# Patient Record
Sex: FEMALE | Race: WHITE | NOT HISPANIC OR LATINO | Employment: UNEMPLOYED | URBAN - NONMETROPOLITAN AREA
[De-identification: names, ages, dates, MRNs, and addresses within clinical notes are randomized per-mention and may not be internally consistent; named-entity substitution may affect disease eponyms.]

---
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
## 2024-07-15 ENCOUNTER — TELEPHONE (OUTPATIENT)
Dept: OBSTETRICS AND GYNECOLOGY | Facility: CLINIC | Age: 19
End: 2024-07-15
Payer: MEDICAID

## 2024-07-15 NOTE — TELEPHONE ENCOUNTER
----- Message from Almita Prince sent at 7/15/2024  1:10 PM CDT -----  Contact: pt  Pt calling to schedule appt for pregnancy confirmation and she can be reached at [phone].    Thanks,

## 2024-07-15 NOTE — TELEPHONE ENCOUNTER
----- Message from Salo Gibson sent at 7/15/2024 12:22 PM CDT -----  Contact: Jasmin Castellanos is needing a call back in regards to scheduling a pregnancy confirmation appt. Please give her a call back at [phone]

## 2024-07-15 NOTE — TELEPHONE ENCOUNTER
Informed patient Ms. Castellanos is partnered with Dr. Carey and Dr. Suarez, who will deliver her. Patient verbalized she will call back.

## 2024-08-05 DIAGNOSIS — Z03.89 ENCOUNTER FOR OBSERVATION FOR OTHER SUSPECTED DISEASES AND CONDITIONS RULED OUT: ICD-10-CM

## 2024-08-05 DIAGNOSIS — Z34.91 ENCOUNTER FOR PREGNANCY RELATED EXAMINATION IN FIRST TRIMESTER: Primary | ICD-10-CM

## 2024-08-09 ENCOUNTER — PROCEDURE VISIT (OUTPATIENT)
Dept: OBSTETRICS AND GYNECOLOGY | Facility: CLINIC | Age: 19
End: 2024-08-09
Payer: MEDICAID

## 2024-08-09 DIAGNOSIS — Z34.91 ENCOUNTER FOR PREGNANCY RELATED EXAMINATION IN FIRST TRIMESTER: ICD-10-CM

## 2024-08-09 PROCEDURE — 76801 OB US < 14 WKS SINGLE FETUS: CPT | Mod: ,,, | Performed by: OBSTETRICS & GYNECOLOGY

## 2024-08-14 ENCOUNTER — TELEPHONE (OUTPATIENT)
Dept: OBSTETRICS AND GYNECOLOGY | Facility: CLINIC | Age: 19
End: 2024-08-14
Payer: MEDICAID

## 2024-08-14 NOTE — TELEPHONE ENCOUNTER
Talked with pt about unisom & b6 pt said she will try that, I also advised her that if she thinks she needs that she can go to the ER and let them giver her fluids to help also. Pt verbalized understanding     ----- Message from Nay Raines sent at 8/14/2024 10:41 AM CDT -----  Regarding: Medical Advice  Contact: jazz  Type:  Needs Medical Advice    Who Called: Jazz   Symptoms (please be specific): vomiting most meals   How long has patient had these symptoms:     Pharmacy name and phone #:    Would the patient rather a call back or a response via My [facility]? call  Best Call Back Number:  [phone] (home)    Additional Information: Jazz is requesting a callback from the nurse today in regard to her symptoms and she is pregnant. Her next appointment is 08/30/2024

## 2024-08-30 ENCOUNTER — ROUTINE PRENATAL (OUTPATIENT)
Dept: OBSTETRICS AND GYNECOLOGY | Facility: CLINIC | Age: 19
End: 2024-08-30
Payer: MEDICAID

## 2024-08-30 VITALS
BODY MASS INDEX: 39.82 KG/M2 | SYSTOLIC BLOOD PRESSURE: 125 MMHG | DIASTOLIC BLOOD PRESSURE: 77 MMHG | HEART RATE: 100 BPM | WEIGHT: 232 LBS

## 2024-08-30 DIAGNOSIS — Z34.91 ENCOUNTER FOR PREGNANCY RELATED EXAMINATION IN FIRST TRIMESTER: Primary | ICD-10-CM

## 2024-08-30 NOTE — PROGRESS NOTES
Subjective:       Patient ID: Jasmin Cooper is a 19 y.o.  at 10w5d   Chief Complaint:  Initial Prenatal Visit      History of Present Illness  here for new ob exam.  Labs and history were reviewed with the patient today  No complaints      Past Medical History:   Diagnosis Date    ADHD (attention deficit hyperactivity disorder)     Bladder dysfunction     Depression        History reviewed. No pertinent surgical history.    OB:    OB History    Para Term  AB Living   1 0 0 0 0 0   SAB IAB Ectopic Multiple Live Births   0 0 0 0 0      # Outcome Date GA Lbr Otto/2nd Weight Sex Type Anes PTL Lv   1 Current              Gyn: no STD, never had abn pap   Meds: No current outpatient medications on file.    All: Review of patient's allergies indicates:  No Known Allergies    SH:   Social History     Tobacco Use    Smoking status: Every Day     Types: Vaping with nicotine    Smokeless tobacco: Never   Substance Use Topics    Alcohol use: Yes     Comment: OCC      FH: family history is not on file.      Review of Systems  nml 1st trimester sx- sob, dec excercixe tolerance, fatigue and nausea  Neg for vag bleed, dc, vomiting, cp, lof, fever, chills, ns, visual changes, swelling, headaches, constipation/diarrhea, dysuria, freq/urgency of urination     Objective:     Vitals:    24 0847   BP: 125/77   Pulse: 100   Weight: 105.2 kg (232 lb)       NAD  NCAT  pupils normal size  Skin nml no rashes or lesions  No resp distress, resp even and unlabored  nt nd, no rebound no guarding  nml ext fem gent, normal cvx uterus and adnexa, no dc or bleeding  nml appearing rectum  No cyanosis or clubbing, edema appropriate for pregn    Uterus size approp for gest age         Assessment:        1. Encounter for pregnancy related examination in first trimester               Plan:      Encounter for pregnancy related examination in first trimester  -     Trichomonas Vaginalis, JUIEC  -     C. trachomatis/N.  gonorrhoeae by AMP DNA [facility]; Cervix           Pain fever bleeding precautions  Encouraged PNV  rtc 4 wks

## 2024-10-01 ENCOUNTER — PATIENT MESSAGE (OUTPATIENT)
Dept: OBSTETRICS AND GYNECOLOGY | Facility: CLINIC | Age: 19
End: 2024-10-01
Payer: MEDICAID

## 2024-10-06 ENCOUNTER — PATIENT MESSAGE (OUTPATIENT)
Dept: OTHER | Facility: OTHER | Age: 19
End: 2024-10-06
Payer: MEDICAID

## 2024-10-09 ENCOUNTER — ROUTINE PRENATAL (OUTPATIENT)
Dept: OBSTETRICS AND GYNECOLOGY | Facility: CLINIC | Age: 19
End: 2024-10-09
Payer: MEDICAID

## 2024-10-09 ENCOUNTER — TELEPHONE (OUTPATIENT)
Dept: OBSTETRICS AND GYNECOLOGY | Facility: CLINIC | Age: 19
End: 2024-10-09

## 2024-10-09 VITALS
BODY MASS INDEX: 40.36 KG/M2 | HEART RATE: 84 BPM | WEIGHT: 235.13 LBS | DIASTOLIC BLOOD PRESSURE: 70 MMHG | SYSTOLIC BLOOD PRESSURE: 106 MMHG

## 2024-10-09 DIAGNOSIS — Z67.91 RH NEGATIVE STATE IN ANTEPARTUM PERIOD: ICD-10-CM

## 2024-10-09 DIAGNOSIS — Z36.89 ENCOUNTER FOR FETAL ANATOMIC SURVEY: ICD-10-CM

## 2024-10-09 DIAGNOSIS — O26.899 RH NEGATIVE STATE IN ANTEPARTUM PERIOD: ICD-10-CM

## 2024-10-09 DIAGNOSIS — Z34.02 ENCOUNTER FOR SUPERVISION OF NORMAL FIRST PREGNANCY IN SECOND TRIMESTER: Primary | ICD-10-CM

## 2024-10-09 DIAGNOSIS — F12.10 TETRAHYDROCANNABINOL (THC) USE DISORDER, MILD, ABUSE: ICD-10-CM

## 2024-10-09 LAB
AMPHETAMINES (500): NEGATIVE NG/ML
BARBITURATES (200): NEGATIVE NG/ML
BENZODIAZEPINES: NEGATIVE NG/ML
COCAINE (150): NEGATIVE NG/ML
MARIJUANA QUANTITATION: 82 NG/ML (ref 0–50)
MARIJUANA, THC (50): ABNORMAL NG/ML
METHADONE: NEGATIVE NG/ML
OPIATES: NEGATIVE NG/ML
OXYCODONE: NEGATIVE NG/ML
PH: 7.2 (ref 4.5–8)
PHENCYCLIDINE (25): NEGATIVE NG/ML
URINE CREATININE D/S: 148.4 MG/DL

## 2024-10-09 PROCEDURE — 99213 OFFICE O/P EST LOW 20 MIN: CPT | Mod: TH,S$PBB,, | Performed by: NURSE PRACTITIONER

## 2024-10-09 NOTE — PROGRESS NOTES
Subjective:       Patient ID: Jasmin Cooper is a 19 y.o.  at 16w3d      Chief Complaint:  Routine Prenatal Visit      History of Present Illness  No complaints. Reports normal fetal movement. Labs and history reviewed with pt.   Stopped Thc use in July     Review of Systems  Denies n/v, f/c, dysuria, contractions,   VD, VB, round ligament pain, headaches      OB History          1    Para   0    Term   0       0    AB   0    Living   0         SAB   0    IAB   0    Ectopic   0    Multiple   0    Live Births   0                   Objective:     Vitals:    10/09/24 0809   BP: 106/70   Pulse: 84     Wt Readings from Last 3 Encounters:   10/09/24 106.7 kg (235 lb 2 oz)   24 105.2 kg (232 lb)   23 98.5 kg (217 lb 4 oz)        nad  NCAT  pupils normal size  Skin nml no rashes or lesions  No resp distress, resp even and unlabored  Gravid nt, no rebound no guarding  No cyanosis or clubbing, edema appropriate for pregn  FH AGA  FHT: 150s       Assessment:        1. Encounter for supervision of normal first pregnancy in second trimester    2. Tetrahydrocannabinol (THC) use disorder, mild, abuse    3. Encounter for fetal anatomic survey    4. Rh negative state in antepartum period                Plan:        Encounter for supervision of normal first pregnancy in second trimester  -     Maternal Screen AFP (Single Marker); Future; Expected date: 10/09/2024    Tetrahydrocannabinol (THC) use disorder, mild, abuse  -     Drug Screen 8 Panel    Encounter for fetal anatomic survey  -     US OB/GYN Procedure (Viewpoint) - Extended List; Future    Rh negative state in antepartum period       Declines flu vaccine    Anatomy scan @ 22 weeks   Encouraged PNV  Pain, fever, bleeding precautions   Follow up in about 4 weeks (around 2024).

## 2024-10-09 NOTE — TELEPHONE ENCOUNTER
----- Message from Mandie sent at 10/9/2024  9:40 AM CDT -----  Contact: self  Patient is requesting a call back regarding appt today - asking can she come back by to get a doctors excuse for her boyfriends job . Please call back at [phone]

## 2024-10-09 NOTE — LETTER
October 9, 2024      [place] [facility]) - OB GYN  [address]  [place] [address]  Phone: [phone]  Fax: [phone]       Patient: Jasmin Cooper   YOB: 2005  Date of Visit: 10/09/2024    To Whom It May Concern:        Payal Cooper  was at [facility] on 10/09/2024. The patient may return to work/school on *** {With/no:58323} restrictions. If you have any questions or concerns, or if I can be of further assistance, please do not hesitate to contact me.    Sincerely,    Jasmin Crowell NP

## 2024-10-11 LAB
AFP MOM: 1.14
AFP, SERUM: 31 NG/ML
CALC'D GESTATIONAL AGE: 16.4 WEEKS
COLLECTION DATE: NORMAL
COMMENT: NORMAL
DATE OF BIRTH: NORMAL
DONOR AGE: EGG RETRIEVAL: NORMAL
DONOR EGG: NO
EDD DETERMINED BY: NORMAL
EST'D DATE OF DELIVERY: NORMAL
HX OF NEURAL TUBE DEFECTS: NO
INSULIN DEPEND DIABETIC: NO
INTERPRETATION: NORMAL
Lab: NORMAL
MATERNAL WEIGHT: 235 LBS
MOTHER'S ETHNIC ORIGIN: NORMAL
NUMBER OF FETUSES: 1
PREV PREGNANCY DOWN SYND: NO
REPEAT SPECIMEN: NO
RISK FOR ONTD: NORMAL

## 2024-10-13 ENCOUNTER — PATIENT MESSAGE (OUTPATIENT)
Dept: OTHER | Facility: OTHER | Age: 19
End: 2024-10-13
Payer: MEDICAID

## 2024-11-03 ENCOUNTER — PATIENT MESSAGE (OUTPATIENT)
Dept: OTHER | Facility: OTHER | Age: 19
End: 2024-11-03
Payer: MEDICAID

## 2024-11-06 ENCOUNTER — ROUTINE PRENATAL (OUTPATIENT)
Dept: OBSTETRICS AND GYNECOLOGY | Facility: CLINIC | Age: 19
End: 2024-11-06
Payer: MEDICAID

## 2024-11-06 VITALS
SYSTOLIC BLOOD PRESSURE: 110 MMHG | HEART RATE: 85 BPM | BODY MASS INDEX: 41.97 KG/M2 | WEIGHT: 244.5 LBS | DIASTOLIC BLOOD PRESSURE: 68 MMHG

## 2024-11-06 DIAGNOSIS — O26.899 RH NEGATIVE STATE IN ANTEPARTUM PERIOD: ICD-10-CM

## 2024-11-06 DIAGNOSIS — Z67.91 RH NEGATIVE STATE IN ANTEPARTUM PERIOD: ICD-10-CM

## 2024-11-06 DIAGNOSIS — F12.10 TETRAHYDROCANNABINOL (THC) USE DISORDER, MILD, ABUSE: ICD-10-CM

## 2024-11-06 DIAGNOSIS — Z34.02 ENCOUNTER FOR SUPERVISION OF NORMAL FIRST PREGNANCY IN SECOND TRIMESTER: Primary | ICD-10-CM

## 2024-11-06 LAB
AMPHETAMINES (500): NEGATIVE NG/ML
BARBITURATES (200): NEGATIVE NG/ML
BENZODIAZEPINES: NEGATIVE NG/ML
COCAINE (150): NEGATIVE NG/ML
MARIJUANA, THC (50): NEGATIVE NG/ML
METHADONE: NEGATIVE NG/ML
OPIATES: NEGATIVE NG/ML
OXYCODONE: NEGATIVE NG/ML
PH: 6.1 (ref 4.5–8)
PHENCYCLIDINE (25): NEGATIVE NG/ML
URINE CREATININE D/S: 157.8 MG/DL

## 2024-11-06 PROCEDURE — 99213 OFFICE O/P EST LOW 20 MIN: CPT | Mod: TH,S$PBB,, | Performed by: NURSE PRACTITIONER

## 2024-11-06 RX ORDER — FAMOTIDINE 10 MG/1
10 TABLET ORAL 2 TIMES DAILY
COMMUNITY

## 2024-11-06 NOTE — PROGRESS NOTES
Subjective:       Patient ID: Jasmin Cooepr is a 19 y.o.  at 20w3d      Chief Complaint:  Routine Prenatal Visit      History of Present Illness  No complaints. Labs and history reviewed with pt.       Review of Systems  Denies n/v, f/c, dysuria, contractions,   VD, VB, round ligament pain, headaches      OB History          1    Para   0    Term   0       0    AB   0    Living   0         SAB   0    IAB   0    Ectopic   0    Multiple   0    Live Births   0                   Objective:     Vitals:    24 0835   BP: 110/68   Pulse: 85     Wt Readings from Last 3 Encounters:   24 110.9 kg (244 lb 8 oz)   10/09/24 106.7 kg (235 lb 2 oz)   24 105.2 kg (232 lb)        nad  NCAT  pupils normal size  Skin nml no rashes or lesions  No resp distress, resp even and unlabored  Gravid nt, no rebound no guarding  No cyanosis or clubbing, edema appropriate for pregn  FH AGA  FHT: 140s       Assessment:        1. Encounter for supervision of normal first pregnancy in second trimester    2. Rh negative state in antepartum period    3. Tetrahydrocannabinol (THC) use disorder, mild, abuse                Plan:        Encounter for supervision of normal first pregnancy in second trimester    Rh negative state in antepartum period    Tetrahydrocannabinol (THC) use disorder, mild, abuse  -     Drug Screen 8 Panel         Anatomy scan @ 22 weeks   Encouraged PNV  Pain, fever, bleeding precautions   Follow up in about 4 weeks (around 2024).

## 2024-11-22 ENCOUNTER — PROCEDURE VISIT (OUTPATIENT)
Dept: OBSTETRICS AND GYNECOLOGY | Facility: CLINIC | Age: 19
End: 2024-11-22
Payer: MEDICAID

## 2024-11-22 DIAGNOSIS — Z36.89 ENCOUNTER FOR FETAL ANATOMIC SURVEY: ICD-10-CM

## 2024-12-01 ENCOUNTER — PATIENT MESSAGE (OUTPATIENT)
Dept: OTHER | Facility: OTHER | Age: 19
End: 2024-12-01
Payer: MEDICAID

## 2024-12-09 ENCOUNTER — ROUTINE PRENATAL (OUTPATIENT)
Dept: OBSTETRICS AND GYNECOLOGY | Facility: CLINIC | Age: 19
End: 2024-12-09
Payer: MEDICAID

## 2024-12-09 VITALS
WEIGHT: 257.5 LBS | HEART RATE: 90 BPM | BODY MASS INDEX: 44.2 KG/M2 | DIASTOLIC BLOOD PRESSURE: 69 MMHG | SYSTOLIC BLOOD PRESSURE: 106 MMHG

## 2024-12-09 DIAGNOSIS — O26.899 RH NEGATIVE STATE IN ANTEPARTUM PERIOD: ICD-10-CM

## 2024-12-09 DIAGNOSIS — Z34.02 ENCOUNTER FOR SUPERVISION OF NORMAL FIRST PREGNANCY IN SECOND TRIMESTER: Primary | ICD-10-CM

## 2024-12-09 DIAGNOSIS — Z67.91 RH NEGATIVE STATE IN ANTEPARTUM PERIOD: ICD-10-CM

## 2024-12-09 PROCEDURE — 99213 OFFICE O/P EST LOW 20 MIN: CPT | Mod: TH,S$PBB,, | Performed by: NURSE PRACTITIONER

## 2024-12-09 NOTE — PROGRESS NOTES
Subjective:       Patient ID: Jasmin Cooper is a 19 y.o.  at 25w1d      Chief Complaint:  Routine Prenatal Visit      History of Present Illness  No complaints. Reports normal fetal movement. Labs and history reviewed with pt.       Review of Systems  Denies n/v, f/c, dysuria, contractions,   VD, VB, round ligament pain, headaches      OB History          1    Para   0    Term   0       0    AB   0    Living   0         SAB   0    IAB   0    Ectopic   0    Multiple   0    Live Births   0                   Objective:     Vitals:    24 0816   BP: 106/69   Pulse: 90     Wt Readings from Last 3 Encounters:   24 116.8 kg (257 lb 8 oz)   24 110.9 kg (244 lb 8 oz)   10/09/24 106.7 kg (235 lb 2 oz)        nad  NCAT  pupils normal size  Skin nml no rashes or lesions  No resp distress, resp even and unlabored  Gravid nt, no rebound no guarding  No cyanosis or clubbing, edema appropriate for pregn  FH AGA  FHT: 140s       Assessment:        1. Encounter for supervision of normal first pregnancy in second trimester    2. Rh negative state in antepartum period                Plan:        Encounter for supervision of normal first pregnancy in second trimester  -     Glucose, 1 Hr Post 50 GM; Future; Expected date: 2024    Rh negative state in antepartum period    Rhogam @ 28 weeks    Encouraged PNV  Pain, fever, bleeding precautions   Follow up in about 3 weeks (around 2024).           stable

## 2024-12-10 LAB — GLUCOSE 1 HR POST 50 GM: 128 MG/DL

## 2024-12-15 ENCOUNTER — PATIENT MESSAGE (OUTPATIENT)
Dept: OTHER | Facility: OTHER | Age: 19
End: 2024-12-15
Payer: MEDICAID

## 2025-01-06 ENCOUNTER — ROUTINE PRENATAL (OUTPATIENT)
Dept: OBSTETRICS AND GYNECOLOGY | Facility: CLINIC | Age: 20
End: 2025-01-06
Payer: MEDICAID

## 2025-01-06 VITALS
SYSTOLIC BLOOD PRESSURE: 111 MMHG | WEIGHT: 264 LBS | DIASTOLIC BLOOD PRESSURE: 76 MMHG | BODY MASS INDEX: 45.32 KG/M2 | HEART RATE: 91 BPM

## 2025-01-06 DIAGNOSIS — Z67.91 RH NEGATIVE STATE IN ANTEPARTUM PERIOD: ICD-10-CM

## 2025-01-06 DIAGNOSIS — K21.9 GASTROESOPHAGEAL REFLUX IN PREGNANCY: ICD-10-CM

## 2025-01-06 DIAGNOSIS — O99.619 GASTROESOPHAGEAL REFLUX IN PREGNANCY: ICD-10-CM

## 2025-01-06 DIAGNOSIS — Z34.03 ENCOUNTER FOR SUPERVISION OF NORMAL FIRST PREGNANCY IN THIRD TRIMESTER: Primary | ICD-10-CM

## 2025-01-06 DIAGNOSIS — O26.899 RH NEGATIVE STATE IN ANTEPARTUM PERIOD: ICD-10-CM

## 2025-01-06 LAB
ABO + RH BLD: NORMAL
BASOPHILS NFR BLD: 0.2 % (ref 0–3)
EOSINOPHIL NFR BLD: 0.4 % (ref 1–3)
ERYTHROCYTE [DISTWIDTH] IN BLOOD BY AUTOMATED COUNT: 12.8 % (ref 12.5–18)
HCT VFR BLD AUTO: 34.4 % (ref 37–47)
HGB BLD-MCNC: 12.1 G/DL (ref 12–16)
INDIRECT COOMBS: NEGATIVE
LYMPHOCYTES NFR BLD: 16.5 % (ref 25–40)
MCH RBC QN AUTO: 30.9 PG (ref 27–31.2)
MCHC RBC AUTO-ENTMCNC: 35.2 G/DL (ref 31.8–35.4)
MCV RBC AUTO: 87.8 FL (ref 80–97)
MONOCYTES NFR BLD: 7.4 % (ref 1–15)
NEUTROPHILS # BLD AUTO: 8.22 10*3/UL (ref 1.8–7.7)
NEUTROPHILS NFR BLD: 75 % (ref 37–80)
NUCLEATED RED BLOOD CELLS: 0 %
PLATELETS: 247 10*3/UL (ref 142–424)
RBC # BLD AUTO: 3.92 10*6/UL (ref 4.2–5.4)
RH IMMUNE GLOBULIN: NORMAL
WBC # BLD: 11 10*3/UL (ref 4.6–10.2)

## 2025-01-06 PROCEDURE — 99213 OFFICE O/P EST LOW 20 MIN: CPT | Mod: TH,S$PBB,, | Performed by: NURSE PRACTITIONER

## 2025-01-06 RX ORDER — OMEPRAZOLE 40 MG/1
40 CAPSULE, DELAYED RELEASE ORAL DAILY
Qty: 30 CAPSULE | Refills: 3 | Status: SHIPPED | OUTPATIENT
Start: 2025-01-06

## 2025-01-06 RX ORDER — OMEPRAZOLE 40 MG/1
40 CAPSULE, DELAYED RELEASE ORAL
COMMUNITY
Start: 2024-11-14 | End: 2025-01-06 | Stop reason: SDUPTHER

## 2025-01-06 NOTE — PROGRESS NOTES
CC: Follow-Up OB    HPI:   19 y.o.  at 29w1d with EDC of 3/23/2025, by Ultrasound, here for her follow up OB visit.  Denies any complaints.    Pregnancy ROS:  Positive fetal movement   Negative leakage of fluid   Negative vaginal bleeding   Negative headache, vision changes, RUQ pain, epigastric pain  Negative contractions/abdominal pain   Negative pelvic pressure     Physical Exam:  Prenatal Vitals  BP: 111/76  Weight: 119.7 kg (264 lb)  Fetal Heart Rate: 150s  Urine Glucose: Negative  Urine Albumin: Negative    Wt Readings from Last 3 Encounters:   25 119.7 kg (264 lb)   24 116.8 kg (257 lb 8 oz)   24 110.9 kg (244 lb 8 oz)       Body mass index is 45.32 kg/m².    General: NAD, well developed, well nourished  Psych: alert and oriented to person, time and place, normal affect  HEENT: normocephalic, atraumatic  Abd: Gravid, soft, NT, ND  Skin: warm, dry  Neuro: normal gait, gross motor function intact  No cyanosis, clubbing or edema    FHTs: 150s  FH: AGA    Maternal Blood Type: A negative     ASSESSMENT: 19 y.o.  @ 29w1d with   1. Encounter for supervision of normal first pregnancy in third trimester    2. Rh negative state in antepartum period    3. Gastroesophageal reflux in pregnancy         PLAN:  Encounter for supervision of normal first pregnancy in third trimester  -     CBC Auto Differential; Future; Expected date: 2025    Rh negative state in antepartum period  -      Rhogam; Future; Expected date: 2025    Gastroesophageal reflux in pregnancy  -     omeprazole (PRILOSEC) 40 MG capsule; Take 1 capsule (40 mg total) by mouth once daily.  Dispense: 30 capsule; Refill: 3      Rhogam today    Pain, fever, bleeding precautions  Labor, Fetal movement, and Preeclampsia precautions  Cont PNV  Return to clinic in 2 weeks

## 2025-01-12 ENCOUNTER — PATIENT MESSAGE (OUTPATIENT)
Dept: OTHER | Facility: OTHER | Age: 20
End: 2025-01-12
Payer: MEDICAID

## 2025-01-26 ENCOUNTER — PATIENT MESSAGE (OUTPATIENT)
Dept: OTHER | Facility: OTHER | Age: 20
End: 2025-01-26
Payer: MEDICAID

## 2025-01-29 ENCOUNTER — ROUTINE PRENATAL (OUTPATIENT)
Dept: OBSTETRICS AND GYNECOLOGY | Facility: CLINIC | Age: 20
End: 2025-01-29
Payer: MEDICAID

## 2025-01-29 VITALS
SYSTOLIC BLOOD PRESSURE: 105 MMHG | HEART RATE: 76 BPM | BODY MASS INDEX: 46.43 KG/M2 | WEIGHT: 270.5 LBS | DIASTOLIC BLOOD PRESSURE: 69 MMHG

## 2025-01-29 DIAGNOSIS — Z34.03 ENCOUNTER FOR SUPERVISION OF NORMAL FIRST PREGNANCY IN THIRD TRIMESTER: Primary | ICD-10-CM

## 2025-01-29 PROCEDURE — 99213 OFFICE O/P EST LOW 20 MIN: CPT | Mod: TH,S$PBB,, | Performed by: NURSE PRACTITIONER

## 2025-01-29 NOTE — PROGRESS NOTES
CC: Follow-Up OB    HPI:   19 y.o.  at 32w3d with EDC of 3/23/2025, by Ultrasound, here for her follow up OB visit.  Denies any complaints.    Pregnancy ROS:  Positive fetal movement   Negative leakage of fluid   Negative vaginal bleeding   Negative headache, vision changes, RUQ pain, epigastric pain  Negative contractions/abdominal pain   Negative pelvic pressure     Physical Exam:  Prenatal Vitals  BP: 105/69  Weight: 122.7 kg (270 lb 8 oz)  Fetal Heart Rate: 140's    Wt Readings from Last 3 Encounters:   25 122.7 kg (270 lb 8 oz)   25 119.7 kg (264 lb)   24 116.8 kg (257 lb 8 oz)       Body mass index is 46.43 kg/m².    General: NAD, well developed, well nourished  Psych: alert and oriented to person, time and place, normal affect  HEENT: normocephalic, atraumatic  Abd: Gravid, soft, NT, ND  Skin: warm, dry  Neuro: normal gait, gross motor function intact  No cyanosis, clubbing or edema    FHTs: 140s  FH: AGA    Maternal Blood Type: AN s/p Rhogam     ASSESSMENT: 19 y.o.  @ 32w3d with   1. Encounter for supervision of normal first pregnancy in third trimester         PLAN:  Encounter for supervision of normal first pregnancy in third trimester  -     Tdap (BOOSTRIX) vaccine injection 0.5 mL      Discussed breastfeeding and prenatal education classes available.    Pain, fever, bleeding precautions   Labor, Fetal movement, and Preeclampsia precautions  Cont PNV  Return to clinic in 2 weeks

## 2025-02-10 ENCOUNTER — ROUTINE PRENATAL (OUTPATIENT)
Dept: OBSTETRICS AND GYNECOLOGY | Facility: CLINIC | Age: 20
End: 2025-02-10
Payer: MEDICAID

## 2025-02-10 VITALS
WEIGHT: 274.38 LBS | SYSTOLIC BLOOD PRESSURE: 119 MMHG | DIASTOLIC BLOOD PRESSURE: 82 MMHG | BODY MASS INDEX: 47.1 KG/M2 | HEART RATE: 109 BPM

## 2025-02-10 DIAGNOSIS — Z34.03 ENCOUNTER FOR SUPERVISION OF NORMAL FIRST PREGNANCY IN THIRD TRIMESTER: Primary | ICD-10-CM

## 2025-02-10 DIAGNOSIS — Z67.91 RH NEGATIVE STATE IN ANTEPARTUM PERIOD: ICD-10-CM

## 2025-02-10 DIAGNOSIS — O26.899 RH NEGATIVE STATE IN ANTEPARTUM PERIOD: ICD-10-CM

## 2025-02-10 PROCEDURE — 99213 OFFICE O/P EST LOW 20 MIN: CPT | Mod: TH,S$PBB,, | Performed by: NURSE PRACTITIONER

## 2025-02-10 NOTE — PROGRESS NOTES
CC: Follow-Up OB    HPI:   19 y.o.  at 34w1d with EDC of 3/23/2025, by Ultrasound, here for her follow up OB visit.  Denies any complaints.    Pregnancy ROS:  Positive fetal movement   Negative leakage of fluid   Negative vaginal bleeding   Negative headache, vision changes, RUQ pain, epigastric pain  Negative contractions/abdominal pain   Negative pelvic pressure     Physical Exam:  Prenatal Vitals  BP: 119/82  Weight: 124.5 kg (274 lb 6 oz)  Fetal Heart Rate: 140's  Urine Glucose: Negative  Urine Albumin: Negative    Wt Readings from Last 3 Encounters:   02/10/25 124.5 kg (274 lb 6 oz)   25 122.7 kg (270 lb 8 oz)   25 119.7 kg (264 lb)       Body mass index is 47.1 kg/m².    General: NAD, well developed, well nourished  Psych: alert and oriented to person, time and place, normal affect  HEENT: normocephalic, atraumatic  Abd: Gravid, soft, NT, ND  Skin: warm, dry  Neuro: normal gait, gross motor function intact  No cyanosis, clubbing or edema    FHTs: 140s  FH: AGA    Maternal Blood Type: AN s/p Rhogam     ASSESSMENT: 19 y.o.  @ 34w1d with   1. Encounter for supervision of normal first pregnancy in third trimester    2. Rh negative state in antepartum period         PLAN:  Encounter for supervision of normal first pregnancy in third trimester    Rh negative state in antepartum period       Pain, fever, bleeding precautions  Labor, Fetal movement, and Preeclampsia precautions  Cont PNV  Return to clinic in 2 weeks with Dr Carey

## 2025-02-16 ENCOUNTER — PATIENT MESSAGE (OUTPATIENT)
Dept: OTHER | Facility: OTHER | Age: 20
End: 2025-02-16
Payer: MEDICAID

## 2025-02-21 ENCOUNTER — ROUTINE PRENATAL (OUTPATIENT)
Dept: OBSTETRICS AND GYNECOLOGY | Facility: CLINIC | Age: 20
End: 2025-02-21
Payer: MEDICAID

## 2025-02-21 VITALS
DIASTOLIC BLOOD PRESSURE: 80 MMHG | HEART RATE: 91 BPM | WEIGHT: 283.81 LBS | SYSTOLIC BLOOD PRESSURE: 118 MMHG | BODY MASS INDEX: 48.71 KG/M2

## 2025-02-21 DIAGNOSIS — O26.899 RH NEGATIVE STATE IN ANTEPARTUM PERIOD: ICD-10-CM

## 2025-02-21 DIAGNOSIS — Z34.03 ENCOUNTER FOR SUPERVISION OF NORMAL FIRST PREGNANCY IN THIRD TRIMESTER: Primary | ICD-10-CM

## 2025-02-21 DIAGNOSIS — Z67.91 RH NEGATIVE STATE IN ANTEPARTUM PERIOD: ICD-10-CM

## 2025-02-21 NOTE — PROGRESS NOTES
Subjective:       Patient ID: Jasmin Cooper is a 19 y.o.  at 35w5d     Chief Complaint:  Routine Prenatal Visit      History of Present Illness  No complaints. Reports normal sx. Labs and history reviewed with pt.         Review of Systems  Denies n/v, f/c, dysuria, contractions,   VD, VB, round ligament pain, headaches, preE ROS       Objective:     Vitals:    25 0816   BP: 118/80   Pulse: 91     Wt Readings from Last 3 Encounters:   25 128.7 kg (283 lb 12.8 oz)   02/10/25 124.5 kg (274 lb 6 oz)   25 122.7 kg (270 lb 8 oz)       nad  NCAT  pupils normal size  Skin nml no rashes or lesions  No resp distress, resp even and unlabored  Gravid nt, no rebound no guarding  No cyanosis or clubbing, edema appropriate for pregn    FHT: 150's      Assessment:        1. Encounter for supervision of normal first pregnancy in third trimester    2. Rh negative state in antepartum period                Plan:         Encouraged PNV  Pain, fever, bleeding precautions   RTC 1 weeks

## 2025-02-28 ENCOUNTER — ROUTINE PRENATAL (OUTPATIENT)
Dept: OBSTETRICS AND GYNECOLOGY | Facility: CLINIC | Age: 20
End: 2025-02-28
Payer: MEDICAID

## 2025-02-28 VITALS
HEART RATE: 87 BPM | DIASTOLIC BLOOD PRESSURE: 64 MMHG | BODY MASS INDEX: 49.13 KG/M2 | WEIGHT: 286.19 LBS | SYSTOLIC BLOOD PRESSURE: 111 MMHG

## 2025-02-28 DIAGNOSIS — O26.899 RH NEGATIVE STATE IN ANTEPARTUM PERIOD: ICD-10-CM

## 2025-02-28 DIAGNOSIS — Z67.91 RH NEGATIVE STATE IN ANTEPARTUM PERIOD: ICD-10-CM

## 2025-02-28 DIAGNOSIS — Z34.03 ENCOUNTER FOR SUPERVISION OF NORMAL FIRST PREGNANCY IN THIRD TRIMESTER: Primary | ICD-10-CM

## 2025-02-28 NOTE — PROGRESS NOTES
Subjective:       Patient ID: Jasmin Cooper is a 19 y.o.  at 36w5d     Chief Complaint:  Routine Prenatal Visit      History of Present Illness  No complaints. Reports normal sx. Labs and history reviewed with pt.         Review of Systems  Denies n/v, f/c, dysuria, contractions,   VD, VB, round ligament pain, headaches, preE ROS       Objective:     Vitals:    25 0807   BP: 111/64   Pulse: 87     Wt Readings from Last 3 Encounters:   25 129.8 kg (286 lb 3.2 oz)   25 128.7 kg (283 lb 12.8 oz)   02/10/25 124.5 kg (274 lb 6 oz)       nad  NCAT  pupils normal size  Skin nml no rashes or lesions  No resp distress, resp even and unlabored  Gravid nt, no rebound no guarding  No cyanosis or clubbing, edema appropriate for pregn    FHT: 150's  CVX: cl    Assessment:        1. Encounter for supervision of normal first pregnancy in third trimester    2. Rh negative state in antepartum period                Plan:    gbs    Encouraged PNV  Pain, fever, bleeding precautions   RTC 1 weeks

## 2025-03-01 LAB
GROUP B STREP MOLECULAR: NEGATIVE
PENICILLIN ALLERGIC: NO

## 2025-03-07 ENCOUNTER — ROUTINE PRENATAL (OUTPATIENT)
Dept: OBSTETRICS AND GYNECOLOGY | Facility: CLINIC | Age: 20
End: 2025-03-07
Payer: MEDICAID

## 2025-03-07 VITALS
WEIGHT: 286 LBS | DIASTOLIC BLOOD PRESSURE: 71 MMHG | SYSTOLIC BLOOD PRESSURE: 119 MMHG | BODY MASS INDEX: 49.09 KG/M2 | HEART RATE: 99 BPM

## 2025-03-07 DIAGNOSIS — Z34.03 ENCOUNTER FOR SUPERVISION OF NORMAL FIRST PREGNANCY IN THIRD TRIMESTER: Primary | ICD-10-CM

## 2025-03-07 NOTE — PROGRESS NOTES
Subjective:       Patient ID: Jasmin Cooper is a 19 y.o.  at 37w5d     Chief Complaint:  Routine Prenatal Visit      History of Present Illness  No complaints. Reports normal sx. Labs and history reviewed with pt.         Review of Systems  Denies n/v, f/c, dysuria, contractions,   VD, VB, round ligament pain, headaches, preE ROS       Objective:     Vitals:    25 0759   BP: 119/71   Pulse: 99     Wt Readings from Last 3 Encounters:   25 129.7 kg (286 lb)   25 129.8 kg (286 lb 3.2 oz)   25 128.7 kg (283 lb 12.8 oz)       nad  NCAT  pupils normal size  Skin nml no rashes or lesions  No resp distress, resp even and unlabored  Gravid nt, no rebound no guarding  No cyanosis or clubbing, edema appropriate for pregn    FHT: 150's  CVX: 1-2 75 h    Assessment:        1. Encounter for supervision of normal first pregnancy in third trimester                Plan:      \  Encouraged PNV  Pain, fever, bleeding precautions   RTC 1 weeks

## 2025-03-14 ENCOUNTER — ROUTINE PRENATAL (OUTPATIENT)
Dept: OBSTETRICS AND GYNECOLOGY | Facility: CLINIC | Age: 20
End: 2025-03-14
Payer: MEDICAID

## 2025-03-14 VITALS
HEART RATE: 99 BPM | BODY MASS INDEX: 50.16 KG/M2 | WEIGHT: 292.19 LBS | DIASTOLIC BLOOD PRESSURE: 77 MMHG | SYSTOLIC BLOOD PRESSURE: 135 MMHG

## 2025-03-14 DIAGNOSIS — Z34.03 ENCOUNTER FOR SUPERVISION OF NORMAL FIRST PREGNANCY IN THIRD TRIMESTER: Primary | ICD-10-CM

## 2025-03-14 DIAGNOSIS — O26.899 RH NEGATIVE STATE IN ANTEPARTUM PERIOD: ICD-10-CM

## 2025-03-14 DIAGNOSIS — Z67.91 RH NEGATIVE STATE IN ANTEPARTUM PERIOD: ICD-10-CM

## 2025-03-14 NOTE — PROGRESS NOTES
Subjective:       Patient ID: Jasmin Cooper is a 19 y.o.  at 38w5d     Chief Complaint:  Routine Prenatal Visit      History of Present Illness  No complaints. Reports normal sx. Labs and history reviewed with pt.         Review of Systems  Denies n/v, f/c, dysuria, contractions,   VD, VB, round ligament pain, headaches, preE ROS       Objective:     Vitals:    25 0807   BP: 135/77   Pulse: 99     Wt Readings from Last 3 Encounters:   25 132.5 kg (292 lb 3.2 oz)   25 129.7 kg (286 lb)   25 129.8 kg (286 lb 3.2 oz)       nad  NCAT  pupils normal size  Skin nml no rashes or lesions  No resp distress, resp even and unlabored  Gravid nt, no rebound no guarding  No cyanosis or clubbing, edema appropriate for pregn    FHT: 150's  CVX: 1--2 50 h    Assessment:        1. Encounter for supervision of normal first pregnancy in third trimester    2. Rh negative state in antepartum period                Plan:     Encouraged PNV  Pain, fever, bleeding precautions   Iol mond

## 2025-03-17 ENCOUNTER — OUTSIDE PLACE OF SERVICE (OUTPATIENT)
Dept: OBSTETRICS AND GYNECOLOGY | Facility: CLINIC | Age: 20
End: 2025-03-17
Payer: MEDICAID

## 2025-03-19 ENCOUNTER — TELEPHONE (OUTPATIENT)
Dept: OBSTETRICS AND GYNECOLOGY | Facility: CLINIC | Age: 20
End: 2025-03-19
Payer: MEDICAID

## 2025-03-19 ENCOUNTER — PATIENT MESSAGE (OUTPATIENT)
Dept: OBSTETRICS AND GYNECOLOGY | Facility: CLINIC | Age: 20
End: 2025-03-19
Payer: MEDICAID

## 2025-03-19 NOTE — TELEPHONE ENCOUNTER
--  Left message on the portal. Nazanin        --- Message from Aguilar sent at 3/19/2025 10:26 AM CDT -----  Contact: [phone]  Type:  Sooner Apoointment RequestCaller is requesting a sooner appointment.  Caller declined first available appointment listed below.  Caller will not accept being placed on the waitlist and is requesting a message be sent to doctor.Name of Caller:Jasmin When is the first available appointment?04/14/2025Symptoms:wound vac Would the patient rather a call back or a response via [facility]? Call back [facility] Call Back Number:[phone] Information: was told by Dr Carey to schedule Monday 3/24

## 2025-03-21 ENCOUNTER — PATIENT MESSAGE (OUTPATIENT)
Dept: OBSTETRICS AND GYNECOLOGY | Facility: CLINIC | Age: 20
End: 2025-03-21
Payer: MEDICAID

## 2025-03-24 ENCOUNTER — PATIENT MESSAGE (OUTPATIENT)
Dept: OBSTETRICS AND GYNECOLOGY | Facility: CLINIC | Age: 20
End: 2025-03-24

## 2025-03-25 ENCOUNTER — PATIENT MESSAGE (OUTPATIENT)
Dept: OBSTETRICS AND GYNECOLOGY | Facility: CLINIC | Age: 20
End: 2025-03-25
Payer: MEDICAID

## 2025-03-25 ENCOUNTER — TELEPHONE (OUTPATIENT)
Dept: OBSTETRICS AND GYNECOLOGY | Facility: CLINIC | Age: 20
End: 2025-03-25
Payer: MEDICAID

## 2025-03-25 NOTE — TELEPHONE ENCOUNTER
Spoke to pt and her appt is on 3/31 at 10:30. Pt is aware and voices understanding. Nazanin      ----- Message from Breanna sent at 3/25/2025 10:00 AM CDT -----  Contact: Jasmin  Type:  Schedule hospital follow up  Appointment RequestName of Caller:Johnrk:She is at the hospital right now, she states she needs to be seen on Monday march/31/2025Would the patient rather a call back or a response via [facility]? Call [facility] Call Back Number:[phone]!

## 2025-03-27 ENCOUNTER — TELEPHONE (OUTPATIENT)
Dept: OBSTETRICS AND GYNECOLOGY | Facility: CLINIC | Age: 20
End: 2025-03-27
Payer: MEDICAID

## 2025-03-27 NOTE — TELEPHONE ENCOUNTER
----- Message from Med Assistant Altman sent at 3/27/2025  9:17 AM CDT -----  Regarding: FW: medical Advice  Contact: Adina    ----- Message -----  From: Nay Raines  Sent: 3/27/2025   9:10 AM CDT  To: Cherry Gibson (Obgyn) Staff  Subject: medical Advice                                   Type:  Needs Medical AdviceWho Called: Adina Symptoms (please be specific):  How long has patient had these symptoms:  Pharmacy name and phone #:  Would the patient rather a call back or a response via [facility]? [facility] Call Back Number: [phone] Additional Information:  Adina at [facility] with [facility]  is reporting the provider has a pending documentation query in iodine.

## 2025-03-31 ENCOUNTER — POSTPARTUM VISIT (OUTPATIENT)
Dept: OBSTETRICS AND GYNECOLOGY | Facility: CLINIC | Age: 20
End: 2025-03-31
Payer: MEDICAID

## 2025-03-31 VITALS
HEIGHT: 65 IN | BODY MASS INDEX: 43.07 KG/M2 | SYSTOLIC BLOOD PRESSURE: 111 MMHG | WEIGHT: 258.5 LBS | HEART RATE: 84 BPM | DIASTOLIC BLOOD PRESSURE: 70 MMHG

## 2025-03-31 DIAGNOSIS — T81.31XS WOUND DISRUPTION, POST-OP, SKIN, SEQUELA: ICD-10-CM

## 2025-03-31 DIAGNOSIS — T81.40XD POSTOPERATIVE INFECTION, UNSPECIFIED TYPE, SUBSEQUENT ENCOUNTER: Primary | ICD-10-CM

## 2025-03-31 PROCEDURE — 99213 OFFICE O/P EST LOW 20 MIN: CPT | Mod: S$PBB,,, | Performed by: NURSE PRACTITIONER

## 2025-03-31 RX ORDER — IBUPROFEN 800 MG/1
800 TABLET ORAL EVERY 8 HOURS PRN
COMMUNITY
Start: 2025-03-18

## 2025-03-31 RX ORDER — CLINDAMYCIN HYDROCHLORIDE 300 MG/1
300 CAPSULE ORAL 3 TIMES DAILY
COMMUNITY
Start: 2025-03-25 | End: 2025-04-04

## 2025-04-03 ENCOUNTER — TELEPHONE (OUTPATIENT)
Dept: OBSTETRICS AND GYNECOLOGY | Facility: CLINIC | Age: 20
End: 2025-04-03
Payer: MEDICAID

## 2025-04-03 NOTE — TELEPHONE ENCOUNTER
----- Message from Bonnie sent at 4/3/2025  9:53 AM CDT -----    ----- Message -----  From: Susan Mabry  Sent: 4/3/2025   9:52 AM CDT  To: Cherry Gibson (Obgyn) Staff    Type:  Needs Medical AdviceWho Called: Adina Batista Symptoms (please be specific): - How long has patient had these symptoms:  -Pharmacy name and phone #:  -Would the patient rather a call back or a response via [facility]?  [facility] Call Back Number: [phone] Information:  needs to speak w/ Marjorie please call ( folling up on pending Query )

## 2025-04-17 ENCOUNTER — POSTPARTUM VISIT (OUTPATIENT)
Dept: OBSTETRICS AND GYNECOLOGY | Facility: CLINIC | Age: 20
End: 2025-04-17
Payer: MEDICAID

## 2025-04-17 VITALS
BODY MASS INDEX: 41.93 KG/M2 | SYSTOLIC BLOOD PRESSURE: 114 MMHG | WEIGHT: 252 LBS | HEART RATE: 88 BPM | DIASTOLIC BLOOD PRESSURE: 61 MMHG

## 2025-04-17 RX ORDER — IPRATROPIUM BROMIDE 42 UG/1
SPRAY, METERED NASAL
COMMUNITY
Start: 2025-03-01

## 2025-04-17 RX ORDER — NORELGESTROMIN AND ETHINYL ESTRADIOL 35; 150 UG/MG; UG/MG
1 PATCH TRANSDERMAL
Qty: 4 PATCH | Refills: 11 | Status: SHIPPED | OUTPATIENT
Start: 2025-04-17 | End: 2026-04-17

## 2025-04-17 NOTE — PROGRESS NOTES
Subjective:       Patient ID: Jasmin Cooper is a 19 y.o. female.    Chief Complaint:  Postpartum Care      History of Present Illness  Here for 6 wk pp exam sp cs  Complaints none    Review of Systems  Review of Systems   Constitutional:  Negative for activity change, appetite change, chills, diaphoresis and fever.   Respiratory:  Negative for shortness of breath.    Cardiovascular:  Negative for chest pain.   Gastrointestinal:  Negative for abdominal pain, bloating, constipation, nausea and vomiting.   Genitourinary:  Negative for dysuria, flank pain, hematuria and menorrhagia.   Integumentary:  Negative for breast mass, breast skin changes and breast tenderness.   Neurological:  Negative for headaches.   Psychiatric/Behavioral:  Negative for depression. The patient is not nervous/anxious.    Breast: Negative for lump, mass, mastodynia, skin changes and tenderness          Objective:    Physical Exam:   Constitutional: She appears well-developed and well-nourished. No distress.    HENT:   Head: Normocephalic.    Eyes: Conjunctivae and EOM are normal.    Neck: No tracheal deviation present. No thyromegaly present.    Cardiovascular:       Exam reveals no clubbing, no cyanosis and no edema.        Pulmonary/Chest: Effort normal. No respiratory distress.                  Musculoskeletal: Normal range of motion and moves all extremeties.        Skin: No rash noted. She is not diaphoretic. No cyanosis. Nails show no clubbing.    Psychiatric: She has a normal mood and affect. Her behavior is normal. Judgment and thought content normal.        Abd wound healing well- small defect left with some packing  Assessment:     Postpartum      Plan:   rtc for annual or prn  Contraception patch  Preventative screening utd